# Patient Record
Sex: FEMALE | Race: AMERICAN INDIAN OR ALASKA NATIVE
[De-identification: names, ages, dates, MRNs, and addresses within clinical notes are randomized per-mention and may not be internally consistent; named-entity substitution may affect disease eponyms.]

---

## 2018-01-25 ENCOUNTER — HOSPITAL ENCOUNTER (OUTPATIENT)
Dept: HOSPITAL 5 - GIO | Age: 53
Discharge: HOME | End: 2018-01-25
Attending: INTERNAL MEDICINE
Payer: COMMERCIAL

## 2018-01-25 VITALS — SYSTOLIC BLOOD PRESSURE: 136 MMHG | DIASTOLIC BLOOD PRESSURE: 79 MMHG

## 2018-01-25 DIAGNOSIS — E66.01: ICD-10-CM

## 2018-01-25 DIAGNOSIS — Z87.891: ICD-10-CM

## 2018-01-25 DIAGNOSIS — Z98.890: ICD-10-CM

## 2018-01-25 DIAGNOSIS — Z90.710: ICD-10-CM

## 2018-01-25 DIAGNOSIS — E11.9: ICD-10-CM

## 2018-01-25 DIAGNOSIS — I10: ICD-10-CM

## 2018-01-25 DIAGNOSIS — K21.9: ICD-10-CM

## 2018-01-25 DIAGNOSIS — K44.9: ICD-10-CM

## 2018-01-25 DIAGNOSIS — K29.80: ICD-10-CM

## 2018-01-25 DIAGNOSIS — K29.70: Primary | ICD-10-CM

## 2018-01-25 PROCEDURE — 82962 GLUCOSE BLOOD TEST: CPT

## 2018-01-25 PROCEDURE — 43239 EGD BIOPSY SINGLE/MULTIPLE: CPT

## 2018-01-25 PROCEDURE — 88342 IMHCHEM/IMCYTCHM 1ST ANTB: CPT

## 2018-01-25 PROCEDURE — 88305 TISSUE EXAM BY PATHOLOGIST: CPT

## 2018-01-25 NOTE — ANESTHESIA CONSULTATION
Anesthesia Consult and Med Hx





- Airway


Anesthetic Teeth Evaluation: Good


ROM Head & Neck: Adequate


Mental/Hyoid Distance: Adequate


Mallampati Class: Class II


Intubation Access Assessment: Good





- Pulmonary Exam


CTA: Yes





- Cardiac Exam


Cardiac Exam: RRR





- Pre-Operative Health Status


ASA Pre-Surgery Classification: ASA3


Proposed Anesthetic Plan: MAC





- Pre-Anesthesia Comment


Pre-Anesthesia Comments: morbidly obese





- Cardiovascular System


Hx Hypertension: Yes





- Endocrine


Hx Cirrhosis: Yes


Hx Non-Insulin Dependent Diabetes: Yes





- Other Systems


Hx Obesity: Yes

## 2018-01-25 NOTE — SHORT STAY SUMMARY
Short Stay Documentation





- Allergies and Medications


Current Medications: 


 Allergies





No Known Allergies Allergy (Verified 01/25/18 07:31)


 





 Home Medications











 Medication  Instructions  Recorded  Confirmed  Last Taken  Type


 


Hydrochlorothiazide 90 mg PO DAILY 01/25/18 01/25/18 01/24/18 History


 


Lovastatin 30 mg PO DAILY 01/25/18 01/25/18 01/24/18 History


 


Omeprazole 180 mg PO BID 01/25/18 01/25/18 01/24/18 History


 


metFORMIN 500 mg PO BID 01/25/18 01/25/18 01/24/18 History








Active Medications





Sodium Chloride (Nacl 0.9% 1000 Ml)  1,000 mls @ 50 mls/hr IV AS DIRECT OSMIN


   Last Admin: 01/25/18 08:10 Dose:  50 mls/hr











- Brief post op/procedure progress note


Date of procedure: 01/25/18


Pre-op diagnosis: 1. Dysphagia 2. Dyspepsia


Post-op diagnosis: same (1. GERD  2. Hiatal hernia (small)  3. Gastritis  4. 

Duodenitis)


Procedure: 





EGD with biopsy


Anesthesia: MAC


Findings: 





as above


Surgeon: ANJALI CHILDS


Estimated blood loss: none


Pathology: list (1. Antrum)


Specimen disposition: to lab


Condition: stable





- Disposition


Condition at discharge: Stable


Disposition: DC-01 TO HOME OR SELFCARE





Short Stay Discharge Plan


Activity: no restrictions


Weight Bearing Status: Full Weight Bearing


Diet: regular, low salt, diabetic


Follow up with: 


YOSVANY MIDDLETON MD [Primary Care Provider] - 7 Days